# Patient Record
Sex: MALE | Race: BLACK OR AFRICAN AMERICAN | NOT HISPANIC OR LATINO | Employment: OTHER | URBAN - METROPOLITAN AREA
[De-identification: names, ages, dates, MRNs, and addresses within clinical notes are randomized per-mention and may not be internally consistent; named-entity substitution may affect disease eponyms.]

---

## 2017-07-17 ENCOUNTER — HOSPITAL ENCOUNTER (EMERGENCY)
Facility: HOSPITAL | Age: 52
Discharge: HOME | End: 2017-07-17
Attending: EMERGENCY MEDICINE
Payer: OTHER GOVERNMENT

## 2017-07-17 VITALS
RESPIRATION RATE: 20 BRPM | TEMPERATURE: 98 F | BODY MASS INDEX: 32.33 KG/M2 | OXYGEN SATURATION: 97 % | HEIGHT: 75 IN | SYSTOLIC BLOOD PRESSURE: 143 MMHG | DIASTOLIC BLOOD PRESSURE: 71 MMHG | HEART RATE: 75 BPM | WEIGHT: 260 LBS

## 2017-07-17 DIAGNOSIS — M25.511 CHRONIC RIGHT SHOULDER PAIN: Primary | ICD-10-CM

## 2017-07-17 DIAGNOSIS — G89.29 CHRONIC RIGHT SHOULDER PAIN: Primary | ICD-10-CM

## 2017-07-17 PROCEDURE — 25000003 PHARM REV CODE 250: Performed by: EMERGENCY MEDICINE

## 2017-07-17 PROCEDURE — 99283 EMERGENCY DEPT VISIT LOW MDM: CPT

## 2017-07-17 PROCEDURE — 99283 EMERGENCY DEPT VISIT LOW MDM: CPT | Mod: ,,, | Performed by: EMERGENCY MEDICINE

## 2017-07-17 RX ORDER — ZOLPIDEM TARTRATE 10 MG/1
5 TABLET ORAL NIGHTLY PRN
Status: ON HOLD | COMMUNITY
End: 2017-07-28 | Stop reason: HOSPADM

## 2017-07-17 RX ORDER — IBUPROFEN 400 MG/1
800 TABLET ORAL
Status: COMPLETED | OUTPATIENT
Start: 2017-07-17 | End: 2017-07-17

## 2017-07-17 RX ADMIN — IBUPROFEN 800 MG: 400 TABLET, FILM COATED ORAL at 01:07

## 2017-07-17 NOTE — ED PROVIDER NOTES
Encounter Date: 7/17/2017    SCRIBE #1 NOTE: I, Oswaldo Grimaldo, am scribing for, and in the presence of, Dr. Brice.       History     Chief Complaint   Patient presents with    Arm Pain     Pt had right shoulder surgery a few weeks ago. Pt sattes he may have slept on his arm. When pt awoke this evening c/o severe pain to right arm.      Time seen by provider: 1:27 AM    This is a 52 y.o. male with pertinent PMHx of arthritis, tendonitis, and DJD, who presents to the ED with a chief complaint of walking up tonight with moderate to severe right shoulder pain. Pt states that he had surgery on his right shoulder 3-4 weeks ago and had been doing PT and feeling better. Pt states he ran out of the pain medications he had been prescribed but was doing well regardless. However recently the pt did a large amount of strenuous work and then took an Ambien to go to sleep, when he awoke he was lying on his right shoulder and in severe pain. Pt states he is currently on 800mg ibuprofen which has been more or less helpful. There are no other associated symptoms or mitigating/aggravating factors reported at this time.           Review of patient's allergies indicates:  No Known Allergies  Past Medical History:   Diagnosis Date    Arthritis     Degenerative joint disease of spine     Tendonitis      Past Surgical History:   Procedure Laterality Date    ESOPHAGOGASTRODUODENOSCOPY      KNEE SURGERY      SHOULDER SURGERY Right 06/24/2017     No family history on file.  Social History   Substance Use Topics    Smoking status: Never Smoker    Smokeless tobacco: Never Used    Alcohol use No     Review of Systems   Constitutional: Negative for chills and fever.   HENT: Negative for congestion.    Eyes: Negative for pain.   Respiratory: Negative for cough and shortness of breath.    Cardiovascular: Negative for chest pain.   Gastrointestinal: Negative for abdominal pain, nausea and vomiting.   Genitourinary: Negative for difficulty  urinating and dysuria.   Musculoskeletal: Negative for back pain and neck pain.        Positive right shoulder pain.    Skin: Negative for rash.   Neurological: Negative for weakness and headaches.       Physical Exam     Initial Vitals [07/17/17 0010]   BP Pulse Resp Temp SpO2   (!) 143/71 75 20 98.2 °F (36.8 °C) 97 %      MAP       95         Physical Exam    Nursing note and vitals reviewed.  Constitutional: He appears well-developed and well-nourished. No distress.   HENT:   Head: Normocephalic and atraumatic.   Mouth/Throat: Oropharynx is clear and moist.   Eyes: Conjunctivae are normal.   Neck: Normal range of motion.   Cardiovascular: Normal rate, regular rhythm and normal heart sounds.   Pulmonary/Chest: Breath sounds normal. No respiratory distress.   Abdominal: Soft. He exhibits no distension. There is no tenderness.   Musculoskeletal: He exhibits tenderness.   Tenderness to the right anterior deltoid muscle, no bony tenderness. Passive ROM to the right shoulder slightly decreased secondary to pain. Pt is distally neurovascularly intact.    Neurological: He is alert and oriented to person, place, and time.   Skin: Skin is warm and dry.         ED Course   Procedures  Labs Reviewed - No data to display          Medical Decision Making:   History:   Old Medical Records: I decided to obtain old medical records.  Initial Assessment:   This is a 52 y.o. male who presents with right shoulder pain which woke him from sleep that he describes as an exacerbation of his chronic pain. Pain is now improved and I see no indications for imaging. Will give the pt ibuprofen and discharge home to follow up with orthopedics.             Scribe Attestation:   Scribe #1: I performed the above scribed service and the documentation accurately describes the services I performed. I attest to the accuracy of the note.    Attending Attestation:           Physician Attestation for Scribe:  Physician Attestation Statement for Scribe  #1: I, Dr. Brice, reviewed documentation, as scribed by Oswaldo Grimaldo in my presence, and it is both accurate and complete.                 ED Course     Clinical Impression:   The encounter diagnosis was Chronic right shoulder pain.    Disposition:   Disposition: Discharged  Condition: Stable                        Tato Brice MD  07/18/17 2334

## 2017-07-17 NOTE — ED NOTES
Jae Swift, a 52 y.o. male presents to the ED intake 3      Chief Complaint   Patient presents with    Arm Pain     Pt had right shoulder surgery a few weeks ago. Pt sattes he may have slept on his arm. When pt awoke this evening c/o severe pain to right arm.      Review of patient's allergies indicates:  No Known Allergies  Past Medical History:   Diagnosis Date    Arthritis     Degenerative joint disease of spine     Tendonitis

## 2017-07-17 NOTE — ED NOTES
Pt identifiers Jae H Pricechecked and correct  LOC: The patient is awake, alert, aware of environment with an appropriate affect. Oriented x3, speaking appropriately  APPEARANCE: Pt resting comfortably, in no acute distress, pt is clean and well groomed, clothing properly fastened  SKIN: Skin warm, dry and intact, normal skin turgor, moist mucus membranes  RESPIRATORY: Airway is open and patent, respirations are spontaneous, even and unlabored, normal effort and rate  CARDIAC: Normal rate and rhythm, no peripheral edema noted, capillary refill < 3 seconds, bilateral radial pulses 2+  ABDOMEN: Soft, nontender, nondistended. Bowel sounds present   NEUROLOGIC: PERRL, facial expression is symmetrical, patient moving all extremities spontaneously, normal sensation in all extremities when touched with a finger.  Follows all commands appropriately  MUSCULOSKELETAL: No obvious deformities.

## 2017-07-26 ENCOUNTER — HOSPITAL ENCOUNTER (OUTPATIENT)
Facility: HOSPITAL | Age: 52
Discharge: HOME | End: 2017-07-28
Attending: EMERGENCY MEDICINE | Admitting: EMERGENCY MEDICINE
Payer: OTHER GOVERNMENT

## 2017-07-26 DIAGNOSIS — L03.113 CELLULITIS OF RIGHT UPPER EXTREMITY: Primary | ICD-10-CM

## 2017-07-26 DIAGNOSIS — A41.9 SEPSIS, DUE TO UNSPECIFIED ORGANISM: ICD-10-CM

## 2017-07-26 LAB
ALBUMIN SERPL BCP-MCNC: 3.3 G/DL
ALP SERPL-CCNC: 60 U/L
ALT SERPL W/O P-5'-P-CCNC: 19 U/L
ANION GAP SERPL CALC-SCNC: 7 MMOL/L
AST SERPL-CCNC: 18 U/L
BASOPHILS # BLD AUTO: 0.02 K/UL
BASOPHILS NFR BLD: 0.1 %
BILIRUB SERPL-MCNC: 0.9 MG/DL
BUN SERPL-MCNC: 15 MG/DL
CALCIUM SERPL-MCNC: 8.9 MG/DL
CHLORIDE SERPL-SCNC: 105 MMOL/L
CO2 SERPL-SCNC: 25 MMOL/L
CREAT SERPL-MCNC: 0.9 MG/DL
DIFFERENTIAL METHOD: ABNORMAL
EOSINOPHIL # BLD AUTO: 0.1 K/UL
EOSINOPHIL NFR BLD: 0.6 %
ERYTHROCYTE [DISTWIDTH] IN BLOOD BY AUTOMATED COUNT: 14.2 %
EST. GFR  (AFRICAN AMERICAN): >60 ML/MIN/1.73 M^2
EST. GFR  (NON AFRICAN AMERICAN): >60 ML/MIN/1.73 M^2
GLUCOSE SERPL-MCNC: 132 MG/DL
HCT VFR BLD AUTO: 39.7 %
HGB BLD-MCNC: 13.8 G/DL
LYMPHOCYTES # BLD AUTO: 1 K/UL
LYMPHOCYTES NFR BLD: 6.3 %
MCH RBC QN AUTO: 30.9 PG
MCHC RBC AUTO-ENTMCNC: 34.8 G/DL
MCV RBC AUTO: 89 FL
MONOCYTES # BLD AUTO: 1 K/UL
MONOCYTES NFR BLD: 6.3 %
NEUTROPHILS # BLD AUTO: 13.6 K/UL
NEUTROPHILS NFR BLD: 86.3 %
PLATELET # BLD AUTO: 166 K/UL
PMV BLD AUTO: 10.5 FL
POTASSIUM SERPL-SCNC: 3.4 MMOL/L
PROT SERPL-MCNC: 6.9 G/DL
RBC # BLD AUTO: 4.46 M/UL
SODIUM SERPL-SCNC: 137 MMOL/L
WBC # BLD AUTO: 15.72 K/UL

## 2017-07-26 PROCEDURE — G0378 HOSPITAL OBSERVATION PER HR: HCPCS

## 2017-07-26 PROCEDURE — 63600175 PHARM REV CODE 636 W HCPCS: Performed by: HOSPITALIST

## 2017-07-26 PROCEDURE — 80053 COMPREHEN METABOLIC PANEL: CPT

## 2017-07-26 PROCEDURE — 96365 THER/PROPH/DIAG IV INF INIT: CPT

## 2017-07-26 PROCEDURE — 63600175 PHARM REV CODE 636 W HCPCS

## 2017-07-26 PROCEDURE — 83036 HEMOGLOBIN GLYCOSYLATED A1C: CPT

## 2017-07-26 PROCEDURE — 99284 EMERGENCY DEPT VISIT MOD MDM: CPT | Mod: 25

## 2017-07-26 PROCEDURE — 25000003 PHARM REV CODE 250

## 2017-07-26 PROCEDURE — 25000003 PHARM REV CODE 250: Performed by: HOSPITALIST

## 2017-07-26 PROCEDURE — 99219 PR INITIAL OBSERVATION CARE,LEVL II: CPT | Mod: ,,, | Performed by: HOSPITALIST

## 2017-07-26 PROCEDURE — 85025 COMPLETE CBC W/AUTO DIFF WBC: CPT

## 2017-07-26 PROCEDURE — 25000003 PHARM REV CODE 250: Performed by: EMERGENCY MEDICINE

## 2017-07-26 PROCEDURE — 99284 EMERGENCY DEPT VISIT MOD MDM: CPT | Performed by: EMERGENCY MEDICINE

## 2017-07-26 PROCEDURE — A4216 STERILE WATER/SALINE, 10 ML: HCPCS | Performed by: HOSPITALIST

## 2017-07-26 PROCEDURE — 82962 GLUCOSE BLOOD TEST: CPT

## 2017-07-26 RX ORDER — HYDROCODONE BITARTRATE AND ACETAMINOPHEN 5; 325 MG/1; MG/1
1 TABLET ORAL EVERY 4 HOURS PRN
Status: DISCONTINUED | OUTPATIENT
Start: 2017-07-26 | End: 2017-07-28 | Stop reason: HOSPADM

## 2017-07-26 RX ORDER — ACETAMINOPHEN 325 MG/1
650 TABLET ORAL EVERY 4 HOURS PRN
Status: DISCONTINUED | OUTPATIENT
Start: 2017-07-26 | End: 2017-07-28 | Stop reason: HOSPADM

## 2017-07-26 RX ORDER — MORPHINE SULFATE 2 MG/ML
4 INJECTION, SOLUTION INTRAMUSCULAR; INTRAVENOUS EVERY 4 HOURS PRN
Status: DISCONTINUED | OUTPATIENT
Start: 2017-07-26 | End: 2017-07-27

## 2017-07-26 RX ORDER — AMOXICILLIN 250 MG
1 CAPSULE ORAL 2 TIMES DAILY
Status: DISCONTINUED | OUTPATIENT
Start: 2017-07-26 | End: 2017-07-28 | Stop reason: HOSPADM

## 2017-07-26 RX ORDER — HYDROCODONE BITARTRATE AND ACETAMINOPHEN 5; 325 MG/1; MG/1
1 TABLET ORAL EVERY 4 HOURS PRN
Status: DISCONTINUED | OUTPATIENT
Start: 2017-07-26 | End: 2017-07-26

## 2017-07-26 RX ORDER — SODIUM CHLORIDE 0.9 % (FLUSH) 0.9 %
3 SYRINGE (ML) INJECTION EVERY 8 HOURS
Status: DISCONTINUED | OUTPATIENT
Start: 2017-07-26 | End: 2017-07-28 | Stop reason: HOSPADM

## 2017-07-26 RX ORDER — CEFAZOLIN SODIUM 1 G/50ML
1 SOLUTION INTRAVENOUS
Status: DISCONTINUED | OUTPATIENT
Start: 2017-07-26 | End: 2017-07-26

## 2017-07-26 RX ORDER — ENOXAPARIN SODIUM 100 MG/ML
40 INJECTION SUBCUTANEOUS EVERY 24 HOURS
Status: DISCONTINUED | OUTPATIENT
Start: 2017-07-26 | End: 2017-07-28 | Stop reason: HOSPADM

## 2017-07-26 RX ORDER — CEFAZOLIN SODIUM 1 G/3ML
1 INJECTION, POWDER, FOR SOLUTION INTRAMUSCULAR; INTRAVENOUS
Status: DISCONTINUED | OUTPATIENT
Start: 2017-07-26 | End: 2017-07-26

## 2017-07-26 RX ORDER — CEFAZOLIN SODIUM 1 G/50ML
1 SOLUTION INTRAVENOUS ONCE
Status: COMPLETED | OUTPATIENT
Start: 2017-07-26 | End: 2017-07-26

## 2017-07-26 RX ORDER — ONDANSETRON 2 MG/ML
4 INJECTION INTRAMUSCULAR; INTRAVENOUS EVERY 12 HOURS PRN
Status: DISCONTINUED | OUTPATIENT
Start: 2017-07-26 | End: 2017-07-28 | Stop reason: HOSPADM

## 2017-07-26 RX ADMIN — HYDROCODONE BITARTRATE AND ACETAMINOPHEN 1 TABLET: 5; 325 TABLET ORAL at 04:07

## 2017-07-26 RX ADMIN — MORPHINE SULFATE 4 MG: 2 INJECTION, SOLUTION INTRAMUSCULAR; INTRAVENOUS at 11:07

## 2017-07-26 RX ADMIN — ENOXAPARIN SODIUM 40 MG: 100 INJECTION SUBCUTANEOUS at 04:07

## 2017-07-26 RX ADMIN — SODIUM CHLORIDE 1000 ML: 0.9 INJECTION, SOLUTION INTRAVENOUS at 09:07

## 2017-07-26 RX ADMIN — VANCOMYCIN HYDROCHLORIDE 1750 MG: 100 INJECTION, POWDER, LYOPHILIZED, FOR SOLUTION INTRAVENOUS at 05:07

## 2017-07-26 RX ADMIN — Medication 3 ML: at 11:07

## 2017-07-26 RX ADMIN — MORPHINE SULFATE 4 MG: 2 INJECTION, SOLUTION INTRAMUSCULAR; INTRAVENOUS at 01:07

## 2017-07-26 RX ADMIN — HYDROCODONE BITARTRATE AND ACETAMINOPHEN 1 TABLET: 5; 325 TABLET ORAL at 08:07

## 2017-07-26 RX ADMIN — STANDARDIZED SENNA CONCENTRATE AND DOCUSATE SODIUM 1 TABLET: 8.6; 5 TABLET, FILM COATED ORAL at 08:07

## 2017-07-26 RX ADMIN — CEFAZOLIN SODIUM 1 G: 1 SOLUTION INTRAVENOUS at 09:07

## 2017-07-26 RX ADMIN — ACETAMINOPHEN 650 MG: 325 TABLET ORAL at 08:07

## 2017-07-26 RX ADMIN — MORPHINE SULFATE 4 MG: 2 INJECTION, SOLUTION INTRAMUSCULAR; INTRAVENOUS at 06:07

## 2017-07-26 RX ADMIN — HYDROCODONE BITARTRATE AND ACETAMINOPHEN 1 TABLET: 5; 325 TABLET ORAL at 10:07

## 2017-07-26 NOTE — SUBJECTIVE & OBJECTIVE
Past Medical History:   Diagnosis Date    Arthritis     Degenerative joint disease of spine     Tendonitis        Past Surgical History:   Procedure Laterality Date    ESOPHAGOGASTRODUODENOSCOPY      KNEE SURGERY      SHOULDER SURGERY Right 06/24/2017       Review of patient's allergies indicates:  No Known Allergies    No current facility-administered medications on file prior to encounter.      Current Outpatient Prescriptions on File Prior to Encounter   Medication Sig    oxycodone-acetaminophen (PERCOCET) 5-325 mg per tablet Take 1 tablet by mouth every 4 (four) hours as needed for Pain.    zolpidem (AMBIEN) 10 mg Tab Take 5 mg by mouth nightly as needed.     Family History     Problem Relation (Age of Onset)    Diabetes Mother, Father        Social History Main Topics    Smoking status: Never Smoker    Smokeless tobacco: Never Used    Alcohol use No    Drug use: No    Sexual activity: Not on file     Review of Systems   Constitutional: Negative for appetite change, chills, diaphoresis, fatigue, fever and unexpected weight change.             HENT: Negative for congestion and trouble swallowing.    Eyes: Negative for visual disturbance.   Respiratory: Negative for cough, shortness of breath and wheezing.    Cardiovascular: Negative for chest pain and leg swelling.   Gastrointestinal: Negative for abdominal pain, constipation, diarrhea, nausea and vomiting.   Genitourinary: Negative for difficulty urinating and dysuria.   Musculoskeletal: Negative for arthralgias, back pain, gait problem, neck pain and neck stiffness.   Skin: Positive for rash. Negative for wound.               Neurological: Negative for dizziness, speech difficulty, light-headedness and headaches.   Psychiatric/Behavioral: Negative for agitation and behavioral problems.     Objective:     Vital Signs (Most Recent):  Temp: 98.3 °F (36.8 °C) (07/26/17 1200)  Pulse: 76 (07/26/17 1200)  Resp: 16 (07/26/17 1200)  BP: 114/73 (07/26/17  1200)  SpO2: 97 % (07/26/17 1200) Vital Signs (24h Range):  Temp:  [98.3 °F (36.8 °C)-98.6 °F (37 °C)] 98.3 °F (36.8 °C)  Pulse:  [76-93] 76  Resp:  [16] 16  SpO2:  [97 %-99 %] 97 %  BP: (114-137)/(73-85) 114/73     Weight: 120.2 kg (265 lb)  Body mass index is 33.12 kg/m².    Physical Exam   Constitutional: He is oriented to person, place, and time. He appears well-developed and well-nourished. No distress.   HENT:   Head: Normocephalic.   Mouth/Throat: Oropharynx is clear and moist.   Eyes: Conjunctivae and EOM are normal. Pupils are equal, round, and reactive to light. No scleral icterus.   Neck: Normal range of motion and full passive range of motion without pain. Neck supple. No JVD present. Carotid bruit is not present. No tracheal deviation, no edema and normal range of motion present. No thyromegaly present.   Cardiovascular: Normal rate, regular rhythm, normal heart sounds and intact distal pulses.  Exam reveals no gallop and no friction rub.    No murmur heard.  Pulmonary/Chest: Effort normal and breath sounds normal. No accessory muscle usage or stridor. No apnea. No respiratory distress. He has no wheezes. He has no rales. He exhibits no tenderness.   Abdominal: Soft. Bowel sounds are normal. He exhibits no distension, no ascites and no mass. There is no tenderness. There is no rebound and no guarding.   Musculoskeletal: Normal range of motion. He exhibits no edema or tenderness.   Lymphadenopathy:        Head (right side): No posterior auricular adenopathy present.     He has no cervical adenopathy.   Neurological: He is alert and oriented to person, place, and time. He has normal strength. He displays normal reflexes.   Skin: Skin is warm and dry. No abrasion, no bruising, no ecchymosis, no laceration and no rash noted. He is not diaphoretic. No cyanosis or erythema. No pallor. Nails show no clubbing.   superfiscial spreading eyrthema rght arn, marked.  Tender and warm to tough.      Full ROM at elbow  ans shoulder joint with out tenderness   Psychiatric: He has a normal mood and affect. His behavior is normal. Judgment and thought content normal.        Significant Labs:   CBC:   Recent Labs  Lab 07/26/17 0914   WBC 15.72*   HGB 13.8*   HCT 39.7*        CMP:   Recent Labs  Lab 07/26/17 0914      K 3.4*      CO2 25   *   BUN 15   CREATININE 0.9   CALCIUM 8.9   PROT 6.9   ALBUMIN 3.3*   BILITOT 0.9   ALKPHOS 60   AST 18   ALT 19   ANIONGAP 7*   EGFRNONAA >60.0       Significant Imaging: I have reviewed and interpreted all pertinent imaging results/findings within the past 24 hours.

## 2017-07-26 NOTE — H&P
[facility] Hospital Medicine  History & Physical    Patient Name: Jae Swift  MRN: [id number]  Admission Date: 7/26/2017  Attending Physician: Shiloh Ricks MD  Primary Care Provider: Primary Doctor [facility] Medicine Team: [facility] Shiloh Ricks MD     Patient information was obtained from patient and ER records.     Subjective:     Principal Problem:<principal problem not specified>    Chief Complaint:   Chief Complaint   Patient presents with    Abscess        HPI: 52 y.o. male who Noticed pain and erythema of the right arm after cutting grass yesterday,  He  failed tyenol, moving made pain worse,  He has decreased ROM arm.  erythtem is located over dorsollateral aspect of right arm.  Warm to touch and marked.  He has history of shoulder surgery this year.  He denied fever,chills  insect bites, chest pain, SO, N&V    Given ancef 1 nena IV in ED.     Past Medical History:   Diagnosis Date    Arthritis     Degenerative joint disease of spine     Tendonitis        Past Surgical History:   Procedure Laterality Date    ESOPHAGOGASTRODUODENOSCOPY      KNEE SURGERY      SHOULDER SURGERY Right 06/24/2017       Review of patient's allergies indicates:  No Known Allergies    No current facility-administered medications on file prior to encounter.      Current Outpatient Prescriptions on File Prior to Encounter   Medication Sig    oxycodone-acetaminophen (PERCOCET) 5-325 mg per tablet Take 1 tablet by mouth every 4 (four) hours as needed for Pain.    zolpidem (AMBIEN) 10 mg Tab Take 5 mg by mouth nightly as needed.     Family History     Problem Relation (Age of Onset)    Diabetes Mother, Father        Social History Main Topics    Smoking status: Never Smoker    Smokeless tobacco: Never Used    Alcohol use No    Drug use: No    Sexual activity: Not on file     Review of Systems   Constitutional: Negative for appetite change, chills, diaphoresis, fatigue, fever and  unexpected weight change.             HENT: Negative for congestion and trouble swallowing.    Eyes: Negative for visual disturbance.   Respiratory: Negative for cough, shortness of breath and wheezing.    Cardiovascular: Negative for chest pain and leg swelling.   Gastrointestinal: Negative for abdominal pain, constipation, diarrhea, nausea and vomiting.   Genitourinary: Negative for difficulty urinating and dysuria.   Musculoskeletal: Negative for arthralgias, back pain, gait problem, neck pain and neck stiffness.   Skin: Positive for rash. Negative for wound.               Neurological: Negative for dizziness, speech difficulty, light-headedness and headaches.   Psychiatric/Behavioral: Negative for agitation and behavioral problems.     Objective:     Vital Signs (Most Recent):  Temp: 98.3 °F (36.8 °C) (07/26/17 1200)  Pulse: 76 (07/26/17 1200)  Resp: 16 (07/26/17 1200)  BP: 114/73 (07/26/17 1200)  SpO2: 97 % (07/26/17 1200) Vital Signs (24h Range):  Temp:  [98.3 °F (36.8 °C)-98.6 °F (37 °C)] 98.3 °F (36.8 °C)  Pulse:  [76-93] 76  Resp:  [16] 16  SpO2:  [97 %-99 %] 97 %  BP: (114-137)/(73-85) 114/73     Weight: 120.2 kg (265 lb)  Body mass index is 33.12 kg/m².    Physical Exam   Constitutional: He is oriented to person, place, and time. He appears well-developed and well-nourished. No distress.   HENT:   Head: Normocephalic.   Mouth/Throat: Oropharynx is clear and moist.   Eyes: Conjunctivae and EOM are normal. Pupils are equal, round, and reactive to light. No scleral icterus.   Neck: Normal range of motion and full passive range of motion without pain. Neck supple. No JVD present. Carotid bruit is not present. No tracheal deviation, no edema and normal range of motion present. No thyromegaly present.   Cardiovascular: Normal rate, regular rhythm, normal heart sounds and intact distal pulses.  Exam reveals no gallop and no friction rub.    No murmur heard.  Pulmonary/Chest: Effort normal and breath sounds  normal. No accessory muscle usage or stridor. No apnea. No respiratory distress. He has no wheezes. He has no rales. He exhibits no tenderness.   Abdominal: Soft. Bowel sounds are normal. He exhibits no distension, no ascites and no mass. There is no tenderness. There is no rebound and no guarding.   Musculoskeletal: Normal range of motion. He exhibits no edema or tenderness.   Lymphadenopathy:        Head (right side): No posterior auricular adenopathy present.     He has no cervical adenopathy.   Neurological: He is alert and oriented to person, place, and time. He has normal strength. He displays normal reflexes.   Skin: Skin is warm and dry. No abrasion, no bruising, no ecchymosis, no laceration and no rash noted. He is not diaphoretic. No cyanosis or erythema. No pallor. Nails show no clubbing.   superfiscial spreading eyrthema rght arn, marked.  Tender and warm to tough.      Full ROM at elbow ans shoulder joint with out tenderness   Psychiatric: He has a normal mood and affect. His behavior is normal. Judgment and thought content normal.        Significant Labs:   CBC:   Recent Labs  Lab 07/26/17  0914   WBC 15.72*   HGB 13.8*   HCT 39.7*        CMP:   Recent Labs  Lab 07/26/17  0914      K 3.4*      CO2 25   *   BUN 15   CREATININE 0.9   CALCIUM 8.9   PROT 6.9   ALBUMIN 3.3*   BILITOT 0.9   ALKPHOS 60   AST 18   ALT 19   ANIONGAP 7*   EGFRNONAA >60.0       Significant Imaging: I have reviewed and interpreted all pertinent imaging results/findings within the past 24 hours.    Assessment/Plan:     Sepsis    2/4 SIRs + source, cellulits  IV ancef given in ED and no response yet.  Will change to IV vanc.          Cellulitis of right upper extremity    As above          VTE Risk Mitigation         Ordered     enoxaparin injection 40 mg  Daily     Route:  Subcutaneous        07/26/17 1152     Medium Risk of VTE  Once      07/26/17 1520        Shiloh Ricks MD  Department of Hospital  Medicine   [facility]

## 2017-07-26 NOTE — ED PROVIDER NOTES
Encounter Date: 7/26/2017       History     Chief Complaint   Patient presents with    Abscess     51yo male with medical history of arthritis presents to ED with swelling, warmth and tenderness of right upper extremity. Patient states symptoms occurred yesterday after cutting grass. His right arm was red and tender, took a nap and when he woke up, the redness, warmth and swelling had worsened. He has concerns for possibility of an insect bite. Symptoms have continued to worsen in the past 12 hours. Reports recent shoulder surgery approximately  1 month ago. Reports stable pain and decreased ROM to right shoulder. Patient denies fever, chills, n/v, cp,sob, drainage, weakness, syncope, changes in urination, changes in bowel.           Review of patient's allergies indicates:  No Known Allergies  Past Medical History:   Diagnosis Date    Arthritis     Degenerative joint disease of spine     Tendonitis      Past Surgical History:   Procedure Laterality Date    ESOPHAGOGASTRODUODENOSCOPY      KNEE SURGERY      SHOULDER SURGERY Right 06/24/2017     Family History   Problem Relation Age of Onset    Diabetes Mother     Diabetes Father      Social History   Substance Use Topics    Smoking status: Never Smoker    Smokeless tobacco: Never Used    Alcohol use No     Review of Systems   Constitutional: Negative for diaphoresis and fever.   HENT: Negative for nosebleeds.    Eyes: Negative for visual disturbance.   Respiratory: Negative for cough and shortness of breath.    Cardiovascular: Negative for chest pain and leg swelling.   Gastrointestinal: Negative for abdominal distention, nausea and vomiting.   Genitourinary: Negative for dysuria and hematuria.   Musculoskeletal: Negative for back pain and neck pain.   Skin: Positive for color change. Negative for pallor and rash.   Neurological: Negative for syncope, weakness, light-headedness and headaches.   Psychiatric/Behavioral: The patient is not nervous/anxious.         Physical Exam     Initial Vitals [07/26/17 0831]   BP Pulse Resp Temp SpO2   124/73 93 16 98.6 °F (37 °C) 97 %      MAP       90         Physical Exam    Vitals reviewed.  Constitutional: Vital signs are normal. He appears well-developed and well-nourished. He is not diaphoretic. No distress.   HENT:   Head: Normocephalic and atraumatic.   Nose: Nose normal.   Mouth/Throat: Oropharynx is clear and moist.   Eyes: Conjunctivae, EOM and lids are normal. Pupils are equal, round, and reactive to light. Lids are everted and swept, no foreign bodies found. Right eye exhibits no discharge. Left eye exhibits no discharge.   Neck: Trachea normal and normal range of motion. Neck supple.   Cardiovascular: Normal rate, regular rhythm, intact distal pulses and normal pulses.   No murmur heard.  Pulmonary/Chest: Breath sounds normal. He has no wheezes. He has no rhonchi. He has no rales.   Abdominal: Soft. Normal appearance and bowel sounds are normal. He exhibits no distension. There is no tenderness.   Musculoskeletal: Normal range of motion.   Neurological: He is alert and oriented to person, place, and time. He has normal strength. No sensory deficit.   Skin: Skin is warm and dry. Capillary refill takes less than 2 seconds. No rash and no abscess noted. There is erythema. No cyanosis.   Psychiatric: He has a normal mood and affect.         ED Course   Procedures  Labs Reviewed   CBC W/ AUTO DIFFERENTIAL - Abnormal; Notable for the following:        Result Value    WBC 15.72 (*)     RBC 4.46 (*)     Hemoglobin 13.8 (*)     Hematocrit 39.7 (*)     Gran # 13.6 (*)     Gran% 86.3 (*)     Lymph% 6.3 (*)     All other components within normal limits   COMPREHENSIVE METABOLIC PANEL - Abnormal; Notable for the following:     Potassium 3.4 (*)     Glucose 132 (*)     Albumin 3.3 (*)     Anion Gap 7 (*)     All other components within normal limits                   APC / Resident Notes:   53yo male with medical history of  arthritis presents to ED with swelling, warmth and tenderness of right upper extremity. Cardiac exam reveals regular rate and rhythm. Lungs clear bilaterally on auscultation with no decreased breath sounds. Abdomen is soft, non tender, non distended with normal bowel sounds x 4. Erythema, warmth and swelling noted from mid right upper arm extending to right wrist. Tenderness inferior of elbow. ROM intact. Strength intact. Sensation intact. No erythema, warmth or swelling to right shoulder, decreased ROM secondary to surgery. Vitals stable. Patient is in no acute distress.     Patient given IVF and IVPB ancef 1g.     Labs reviewed.   WBC 15.72. CMP wnl.    DDX includes but is not limited to cellulitis, abscess, electrolyte abnormality.     Patient placed in observation.    I have discussed and reviewed with my supervising physician.              ED Course     Clinical Impression:   The encounter diagnosis was Cellulitis of right upper extremity.    Disposition:   Disposition: Placed in Observation  Condition: Stable                        Agnes Joyner PA-C  07/26/17 8790

## 2017-07-26 NOTE — ASSESSMENT & PLAN NOTE
2/4 SIRs + source, cellulits  IV ancef given in ED and no response yet.  Will change to IV vanc.

## 2017-07-26 NOTE — PLAN OF CARE
Problem: Patient Care Overview  Goal: Plan of Care Review  Outcome: Ongoing (interventions implemented as appropriate)  Report received from TATYANA Pablo in ED. Pt arrived to unit via WC. Ambulated from WC to bed. AAOx4. VSS on RA. C/o pain 6/10. Received Anita in ED right before being transferred to unit. Will reassess pain level. Edema, redness and warmth noted to R arm. Bed in lowest locked position and call light with reach. NAD noted. Will continue to monitor.

## 2017-07-26 NOTE — HPI
52 y.o. male who Noticed pain and erythema of the right arm after cutting grass yesterday,  He  failed tyenol, moving made pain worse,  He has decreased ROM arm.  erythtem is located over dorsollateral aspect of right arm.  Warm to touch and marked.  He has history of shoulder surgery this year.  He denied fever,chills  insect bites, chest pain, SO, N&V    Given ancef 1 nena IV in ED.

## 2017-07-26 NOTE — HOSPITAL COURSE
"7/27 - rash is improved.  Pain is out of proportion to what I would epect with this rash.  Suspect hyperalgesia from previous use of opioids.  Will keep in hospital for observation to make sure he is responding apprpriately.     7/28 - rash clinically improved.  Pain  And ROM improved.  Discused hyperalgesia and gave him "Safe use and SE of Opioids."  Pt in transition b/t acute to chronic pain, needs to stop opioids and use alternatives to prevent opioid dependence.   Neurontin started for 7 days.   F/u pcp  "

## 2017-07-26 NOTE — PLAN OF CARE
Problem: Patient Care Overview  Goal: Plan of Care Review  Outcome: Ongoing (interventions implemented as appropriate)  Safety maintained. VSS on RA. Except for temp, has low grade temp. Pain controlled with prn [place] PO and Morphine IVP. Edema, redness and warmth noted to R arm. Does not appear to have spread. Vancomycin IVPB ordered. Will administer as scheduled. Bed in lowest locked position and call light with reach. NAD noted. Will continue to monitor.

## 2017-07-27 PROBLEM — T40.2X5A OPIOID-INDUCED HYPERALGESIA: Status: ACTIVE | Noted: 2017-07-27

## 2017-07-27 PROBLEM — R20.8 OPIOID-INDUCED HYPERALGESIA: Status: ACTIVE | Noted: 2017-07-27

## 2017-07-27 LAB
ALBUMIN SERPL BCP-MCNC: 3 G/DL
ALP SERPL-CCNC: 52 U/L
ALT SERPL W/O P-5'-P-CCNC: 13 U/L
ANION GAP SERPL CALC-SCNC: 6 MMOL/L
AST SERPL-CCNC: 11 U/L
BASOPHILS # BLD AUTO: 0.02 K/UL
BASOPHILS NFR BLD: 0.1 %
BILIRUB SERPL-MCNC: 0.8 MG/DL
BUN SERPL-MCNC: 10 MG/DL
CALCIUM SERPL-MCNC: 8.9 MG/DL
CHLORIDE SERPL-SCNC: 107 MMOL/L
CO2 SERPL-SCNC: 24 MMOL/L
CREAT SERPL-MCNC: 0.9 MG/DL
DIFFERENTIAL METHOD: ABNORMAL
EOSINOPHIL # BLD AUTO: 0.1 K/UL
EOSINOPHIL NFR BLD: 0.7 %
ERYTHROCYTE [DISTWIDTH] IN BLOOD BY AUTOMATED COUNT: 14.5 %
EST. GFR  (AFRICAN AMERICAN): >60 ML/MIN/1.73 M^2
EST. GFR  (NON AFRICAN AMERICAN): >60 ML/MIN/1.73 M^2
ESTIMATED AVG GLUCOSE: 117 MG/DL
GLUCOSE SERPL-MCNC: 105 MG/DL
HBA1C MFR BLD HPLC: 5.7 %
HCT VFR BLD AUTO: 38.4 %
HGB BLD-MCNC: 13.1 G/DL
LYMPHOCYTES # BLD AUTO: 1.7 K/UL
LYMPHOCYTES NFR BLD: 10.4 %
MAGNESIUM SERPL-MCNC: 1.8 MG/DL
MCH RBC QN AUTO: 30 PG
MCHC RBC AUTO-ENTMCNC: 34.1 G/DL
MCV RBC AUTO: 88 FL
MONOCYTES # BLD AUTO: 0.9 K/UL
MONOCYTES NFR BLD: 5.6 %
NEUTROPHILS # BLD AUTO: 13.5 K/UL
NEUTROPHILS NFR BLD: 83 %
PHOSPHATE SERPL-MCNC: 2.4 MG/DL
PLATELET # BLD AUTO: 182 K/UL
PMV BLD AUTO: 11.3 FL
POTASSIUM SERPL-SCNC: 3.6 MMOL/L
PROT SERPL-MCNC: 6.6 G/DL
RBC # BLD AUTO: 4.36 M/UL
SODIUM SERPL-SCNC: 137 MMOL/L
TSH SERPL DL<=0.005 MIU/L-ACNC: 0.56 UIU/ML
WBC # BLD AUTO: 16.31 K/UL

## 2017-07-27 PROCEDURE — 83735 ASSAY OF MAGNESIUM: CPT

## 2017-07-27 PROCEDURE — 85025 COMPLETE CBC W/AUTO DIFF WBC: CPT

## 2017-07-27 PROCEDURE — A4216 STERILE WATER/SALINE, 10 ML: HCPCS | Performed by: HOSPITALIST

## 2017-07-27 PROCEDURE — 84443 ASSAY THYROID STIM HORMONE: CPT

## 2017-07-27 PROCEDURE — 99226 PR SUBSEQUENT OBSERVATION CARE,LEVEL III: CPT | Mod: ,,, | Performed by: HOSPITALIST

## 2017-07-27 PROCEDURE — 63600175 PHARM REV CODE 636 W HCPCS

## 2017-07-27 PROCEDURE — 84100 ASSAY OF PHOSPHORUS: CPT

## 2017-07-27 PROCEDURE — 63600175 PHARM REV CODE 636 W HCPCS: Performed by: HOSPITALIST

## 2017-07-27 PROCEDURE — G0378 HOSPITAL OBSERVATION PER HR: HCPCS

## 2017-07-27 PROCEDURE — 25000003 PHARM REV CODE 250: Performed by: HOSPITALIST

## 2017-07-27 PROCEDURE — 80053 COMPREHEN METABOLIC PANEL: CPT

## 2017-07-27 PROCEDURE — 25000003 PHARM REV CODE 250

## 2017-07-27 PROCEDURE — 36415 COLL VENOUS BLD VENIPUNCTURE: CPT

## 2017-07-27 RX ORDER — IBUPROFEN 400 MG/1
400 TABLET ORAL EVERY 6 HOURS PRN
Status: DISCONTINUED | OUTPATIENT
Start: 2017-07-27 | End: 2017-07-28 | Stop reason: HOSPADM

## 2017-07-27 RX ORDER — GABAPENTIN 100 MG/1
200 CAPSULE ORAL 3 TIMES DAILY
Status: DISCONTINUED | OUTPATIENT
Start: 2017-07-27 | End: 2017-07-28 | Stop reason: HOSPADM

## 2017-07-27 RX ORDER — MORPHINE SULFATE ORAL SOLUTION 10 MG/5ML
10 SOLUTION ORAL EVERY 6 HOURS PRN
Status: DISCONTINUED | OUTPATIENT
Start: 2017-07-27 | End: 2017-07-28 | Stop reason: HOSPADM

## 2017-07-27 RX ADMIN — HYDROCODONE BITARTRATE AND ACETAMINOPHEN 1 TABLET: 5; 325 TABLET ORAL at 01:07

## 2017-07-27 RX ADMIN — ENOXAPARIN SODIUM 40 MG: 100 INJECTION SUBCUTANEOUS at 05:07

## 2017-07-27 RX ADMIN — Medication 10 MG: at 06:07

## 2017-07-27 RX ADMIN — Medication 3 ML: at 02:07

## 2017-07-27 RX ADMIN — STANDARDIZED SENNA CONCENTRATE AND DOCUSATE SODIUM 1 TABLET: 8.6; 5 TABLET, FILM COATED ORAL at 08:07

## 2017-07-27 RX ADMIN — ACETAMINOPHEN 650 MG: 325 TABLET ORAL at 08:07

## 2017-07-27 RX ADMIN — GABAPENTIN 200 MG: 100 CAPSULE ORAL at 08:07

## 2017-07-27 RX ADMIN — IBUPROFEN 400 MG: 400 TABLET, FILM COATED ORAL at 03:07

## 2017-07-27 RX ADMIN — Medication 3 ML: at 08:07

## 2017-07-27 RX ADMIN — VANCOMYCIN HYDROCHLORIDE 1750 MG: 100 INJECTION, POWDER, LYOPHILIZED, FOR SOLUTION INTRAVENOUS at 06:07

## 2017-07-27 RX ADMIN — Medication 3 ML: at 06:07

## 2017-07-27 RX ADMIN — VANCOMYCIN HYDROCHLORIDE 1750 MG: 100 INJECTION, POWDER, LYOPHILIZED, FOR SOLUTION INTRAVENOUS at 05:07

## 2017-07-27 RX ADMIN — ACETAMINOPHEN 650 MG: 325 TABLET ORAL at 03:07

## 2017-07-27 RX ADMIN — IBUPROFEN 400 MG: 400 TABLET, FILM COATED ORAL at 08:07

## 2017-07-27 RX ADMIN — MORPHINE SULFATE 4 MG: 2 INJECTION, SOLUTION INTRAMUSCULAR; INTRAVENOUS at 06:07

## 2017-07-27 NOTE — PLAN OF CARE
PCP- NONE    Pt has a ride home and family support with mother        07/27/17 1701   Discharge Assessment   Assessment Type Discharge Planning Assessment   Confirmed/corrected address and phone number on facesheet? Yes   Assessment information obtained from? Patient   Expected Length of Stay (days) 3   Communicated expected length of stay with patient/caregiver yes   Prior to hospitilization cognitive status: Alert/Oriented   Prior to hospitalization functional status: Independent   Current cognitive status: Alert/Oriented   Current Functional Status: Independent   Arrived From home or self-care   Lives With alone   Able to Return to Prior Arrangements yes   Is patient able to care for self after discharge? Yes   How many people do you have in your home that can help with your care after discharge? 0   Patient's perception of discharge disposition home health   Readmission Within The Last 30 Days no previous admission in last 30 days   Patient currently being followed by outpatient case management? No   Does the patient currently use HME? No   Patient currently receives private duty nursing? No   Patient currently receives any other outside agency services? No   Equipment Currently Used at Home none   Do you have any problems affording any of your prescribed medications? No   Is the patient taking medications as prescribed? yes   Do you have any financial concerns preventing you from receiving the healthcare you need? No   Does the patient have transportation to healthcare appointments? Yes   Transportation Available family or friend will provide   On Dialysis? No   Does the patient receive services at the [facility]mad[facility] Clinic? No   Are there any open cases? No   Discharge Plan A Home with family   Discharge Plan B Home with family;Home Health   Patient/Family In Agreement With Plan yes

## 2017-07-27 NOTE — ASSESSMENT & PLAN NOTE
7/27 - wbc still high,T max 100,  still w significant pain, admit to hospital  2/4 SIRs + source, cellulits  IV ancef given in ED and no response yet.  Will change to IV vanc.

## 2017-07-27 NOTE — SUBJECTIVE & OBJECTIVE
Review of Systems   Constitutional: Negative for chills, diaphoresis and fever.             Respiratory: Negative for cough, shortness of breath and wheezing.    Cardiovascular: Negative for chest pain and leg swelling.   Gastrointestinal: Negative for abdominal pain, constipation, diarrhea, nausea and vomiting.   Genitourinary: Negative for difficulty urinating and dysuria.   Musculoskeletal: Negative for arthralgias, back pain, gait problem, neck pain and neck stiffness.   Skin: Positive for rash. Negative for wound.               Neurological: Negative for dizziness and headaches.   Psychiatric/Behavioral: Negative for agitation and behavioral problems.     Objective:     Vital Signs (Most Recent):  Temp: 99.1 °F (37.3 °C) (07/27/17 1200)  Pulse: 85 (07/27/17 1200)  Resp: 16 (07/27/17 1200)  BP: 128/81 (07/27/17 1200)  SpO2: 98 % (07/27/17 1200) Vital Signs (24h Range):  Temp:  [99.1 °F (37.3 °C)-100.8 °F (38.2 °C)] 99.1 °F (37.3 °C)  Pulse:  [85-99] 85  Resp:  [16-18] 16  SpO2:  [97 %-100 %] 98 %  BP: (113-128)/(71-81) 128/81     Weight: 120.2 kg (265 lb)  Body mass index is 33.12 kg/m².    Physical Exam   Constitutional: He is oriented to person, place, and time. He appears well-developed and well-nourished. No distress.   HENT:   Head: Normocephalic.   Mouth/Throat: Oropharynx is clear and moist.   Eyes: Conjunctivae and EOM are normal. Pupils are equal, round, and reactive to light. No scleral icterus.   Neck: Normal range of motion and full passive range of motion without pain. Neck supple. Carotid bruit is not present. No edema and normal range of motion present.   Cardiovascular: Normal rate, regular rhythm, normal heart sounds and intact distal pulses.  Exam reveals no gallop and no friction rub.    No murmur heard.  Pulmonary/Chest: Effort normal and breath sounds normal. No accessory muscle usage or stridor. No apnea. No respiratory distress. He has no wheezes. He has no rales. He exhibits no  tenderness.   Abdominal: Soft. Bowel sounds are normal. He exhibits no distension, no ascites and no mass. There is no tenderness. There is no rebound and no guarding.   Musculoskeletal: Normal range of motion. He exhibits no edema or tenderness.   Neurological: He is alert and oriented to person, place, and time. He has normal strength.   Skin: Skin is warm and dry. No abrasion, no bruising, no ecchymosis, no laceration and no rash noted. He is not diaphoretic. No cyanosis or erythema. No pallor. Nails show no clubbing.   superfiscial spreading eyrthema rght arn, marked.  Tender and warm to tough.      Full ROM at elbow ans shoulder joint with out tenderness   Psychiatric: He has a normal mood and affect. His behavior is normal. Judgment and thought content normal.        Significant Labs:   CBC:     Recent Labs  Lab 07/26/17 0914 07/27/17 0617   WBC 15.72* 16.31*   HGB 13.8* 13.1*   HCT 39.7* 38.4*    182     CMP:     Recent Labs  Lab 07/26/17 0914 07/27/17 0617    137   K 3.4* 3.6    107   CO2 25 24   * 105   BUN 15 10   CREATININE 0.9 0.9   CALCIUM 8.9 8.9   PROT 6.9 6.6   ALBUMIN 3.3* 3.0*   BILITOT 0.9 0.8   ALKPHOS 60 52*   AST 18 11   ALT 19 13   ANIONGAP 7* 6*   EGFRNONAA >60.0 >60.0       Significant Imaging: I have reviewed and interpreted all pertinent imaging results/findings within the past 24 hours.

## 2017-07-27 NOTE — PLAN OF CARE
Problem: Pain, Acute (Adult)  Goal: Identify Related Risk Factors and Signs and Symptoms  Related risk factors and signs and symptoms are identified upon initiation of Human Response Clinical Practice Guideline (CPG)   Outcome: Ongoing (interventions implemented as appropriate)  Pt. Pain is acknowledged pain med give as ordered ... Will continue to monitor

## 2017-07-27 NOTE — PLAN OF CARE
Problem: Pain, Acute (Adult)  Intervention: Monitor/Manage Analgesia  Pt. Educated on his pain management medications

## 2017-07-27 NOTE — ASSESSMENT & PLAN NOTE
Pain is out of proportion to expected level of pain.  Some subltle unconscioious pain related behavior noted.   Recommend reducing outpatient long term opioid therapy as soon as possible  Add neurontin  try advil/ tylenol combination,  Change to po morphine if that fails.

## 2017-07-27 NOTE — PLAN OF CARE
Problem: Patient Care Overview  Goal: Plan of Care Review  Outcome: Ongoing (interventions implemented as appropriate)  Safety maintained. VSS on RA. Pain controlled with prn pain medication. Edema, redness and warmth noted to R arm. Vancomycin IVPB given as scheduled. Pt ambulatory and tolerating diet. Bed in lowest locked position and call light with reach. NAD noted. Will continue to monitor.

## 2017-07-27 NOTE — PROGRESS NOTES
[facility] Hospital Medicine  Progress Note    Patient Name: Jae Swift  MRN: [id number]  Patient Class: OP- Observation   Admission Date: 7/26/2017  Length of Stay: 0 days  Attending Physician: Shiloh Ricks MD  Primary Care Provider: Primary Doctor [facility] Medicine Team: [facility] MED B Shiloh Ricks MD    Subjective:     Principal Problem:<principal problem not specified>    HPI:  52 y.o. male who Noticed pain and erythema of the right arm after cutting grass yesterday,  He  failed tyenol, moving made pain worse,  He has decreased ROM arm.  erythtem is located over dorsollateral aspect of right arm.  Warm to touch and marked.  He has history of shoulder surgery this year.  He denied fever,chills  insect bites, chest pain, SO, N&V    Given ancef 1 nena IV in ED.     Hospital Course:  7/27 - rash is improved.  Pain is out of proportion to what I would epect with this rash.  Suspect hyperalgesia from previous use of opioids.  Will keep in hospital for observation to make sure he is responding apprpriately.         Review of Systems   Constitutional: Negative for chills, diaphoresis and fever.             Respiratory: Negative for cough, shortness of breath and wheezing.    Cardiovascular: Negative for chest pain and leg swelling.   Gastrointestinal: Negative for abdominal pain, constipation, diarrhea, nausea and vomiting.   Genitourinary: Negative for difficulty urinating and dysuria.   Musculoskeletal: Negative for arthralgias, back pain, gait problem, neck pain and neck stiffness.   Skin: Positive for rash. Negative for wound.               Neurological: Negative for dizziness and headaches.   Psychiatric/Behavioral: Negative for agitation and behavioral problems.     Objective:     Vital Signs (Most Recent):  Temp: 99.1 °F (37.3 °C) (07/27/17 1200)  Pulse: 85 (07/27/17 1200)  Resp: 16 (07/27/17 1200)  BP: 128/81 (07/27/17 1200)  SpO2: 98 % (07/27/17 1200) Vital Signs (24h  Range):  Temp:  [99.1 °F (37.3 °C)-100.8 °F (38.2 °C)] 99.1 °F (37.3 °C)  Pulse:  [85-99] 85  Resp:  [16-18] 16  SpO2:  [97 %-100 %] 98 %  BP: (113-128)/(71-81) 128/81     Weight: 120.2 kg (265 lb)  Body mass index is 33.12 kg/m².    Physical Exam   Constitutional: He is oriented to person, place, and time. He appears well-developed and well-nourished. No distress.   HENT:   Head: Normocephalic.   Mouth/Throat: Oropharynx is clear and moist.   Eyes: Conjunctivae and EOM are normal. Pupils are equal, round, and reactive to light. No scleral icterus.   Neck: Normal range of motion and full passive range of motion without pain. Neck supple. Carotid bruit is not present. No edema and normal range of motion present.   Cardiovascular: Normal rate, regular rhythm, normal heart sounds and intact distal pulses.  Exam reveals no gallop and no friction rub.    No murmur heard.  Pulmonary/Chest: Effort normal and breath sounds normal. No accessory muscle usage or stridor. No apnea. No respiratory distress. He has no wheezes. He has no rales. He exhibits no tenderness.   Abdominal: Soft. Bowel sounds are normal. He exhibits no distension, no ascites and no mass. There is no tenderness. There is no rebound and no guarding.   Musculoskeletal: Normal range of motion. He exhibits no edema or tenderness.   Neurological: He is alert and oriented to person, place, and time. He has normal strength.   Skin: Skin is warm and dry. No abrasion, no bruising, no ecchymosis, no laceration and no rash noted. He is not diaphoretic. No cyanosis or erythema. No pallor. Nails show no clubbing.   superfiscial spreading eyrthema rght arn, marked.  Tender and warm to tough.      Full ROM at elbow ans shoulder joint with out tenderness   Psychiatric: He has a normal mood and affect. His behavior is normal. Judgment and thought content normal.        Significant Labs:   CBC:     Recent Labs  Lab 07/26/17  0914 07/27/17  0617   WBC 15.72* 16.31*   HGB  13.8* 13.1*   HCT 39.7* 38.4*    182     CMP:     Recent Labs  Lab 07/26/17  0914 07/27/17  0617    137   K 3.4* 3.6    107   CO2 25 24   * 105   BUN 15 10   CREATININE 0.9 0.9   CALCIUM 8.9 8.9   PROT 6.9 6.6   ALBUMIN 3.3* 3.0*   BILITOT 0.9 0.8   ALKPHOS 60 52*   AST 18 11   ALT 19 13   ANIONGAP 7* 6*   EGFRNONAA >60.0 >60.0       Significant Imaging: I have reviewed and interpreted all pertinent imaging results/findings within the past 24 hours.    Assessment/Plan:      Sepsis    7/27 - wbc still high,T max 100,  still w significant pain, admit to hospital  2/4 SIRs + source, cellulits  IV ancef given in ED and no response yet.  Will change to IV vanc.          Cellulitis of right upper extremity    As above        Opioid-induced hyperalgesia    Pain is out of proportion to expected level of pain.  Some subltle unconscioious pain related behavior noted.   Recommend reducing outpatient long term opioid therapy as soon as possible  Add neurontin  try advil/ tylenol combination,  Change to po morphine if that fails.               VTE Risk Mitigation         Ordered     enoxaparin injection 40 mg  Daily     Route:  Subcutaneous        07/26/17 1152     Medium Risk of VTE  Once      07/26/17 1520          Shiloh Ricks MD  Department of Hospital Medicine   [facility]

## 2017-07-28 VITALS
DIASTOLIC BLOOD PRESSURE: 70 MMHG | WEIGHT: 265 LBS | OXYGEN SATURATION: 97 % | BODY MASS INDEX: 32.95 KG/M2 | SYSTOLIC BLOOD PRESSURE: 114 MMHG | HEIGHT: 75 IN | RESPIRATION RATE: 18 BRPM | HEART RATE: 71 BPM | TEMPERATURE: 98 F

## 2017-07-28 PROBLEM — A41.9 SEPSIS: Status: RESOLVED | Noted: 2017-07-26 | Resolved: 2017-07-28

## 2017-07-28 LAB
ALBUMIN SERPL BCP-MCNC: 2.9 G/DL
ALP SERPL-CCNC: 48 U/L
ALT SERPL W/O P-5'-P-CCNC: 12 U/L
ANION GAP SERPL CALC-SCNC: 9 MMOL/L
AST SERPL-CCNC: 11 U/L
BASOPHILS # BLD AUTO: 0.03 K/UL
BASOPHILS NFR BLD: 0.2 %
BILIRUB SERPL-MCNC: 0.5 MG/DL
BUN SERPL-MCNC: 11 MG/DL
CALCIUM SERPL-MCNC: 8.9 MG/DL
CHLORIDE SERPL-SCNC: 109 MMOL/L
CO2 SERPL-SCNC: 22 MMOL/L
CREAT SERPL-MCNC: 0.9 MG/DL
DIFFERENTIAL METHOD: ABNORMAL
EOSINOPHIL # BLD AUTO: 0.2 K/UL
EOSINOPHIL NFR BLD: 1.4 %
ERYTHROCYTE [DISTWIDTH] IN BLOOD BY AUTOMATED COUNT: 14.3 %
EST. GFR  (AFRICAN AMERICAN): >60 ML/MIN/1.73 M^2
EST. GFR  (NON AFRICAN AMERICAN): >60 ML/MIN/1.73 M^2
GLUCOSE SERPL-MCNC: 109 MG/DL
HCT VFR BLD AUTO: 37.5 %
HGB BLD-MCNC: 12.9 G/DL
LYMPHOCYTES # BLD AUTO: 2.1 K/UL
LYMPHOCYTES NFR BLD: 17.2 %
MAGNESIUM SERPL-MCNC: 2.1 MG/DL
MCH RBC QN AUTO: 30.6 PG
MCHC RBC AUTO-ENTMCNC: 34.4 G/DL
MCV RBC AUTO: 89 FL
MONOCYTES # BLD AUTO: 0.7 K/UL
MONOCYTES NFR BLD: 5.4 %
NEUTROPHILS # BLD AUTO: 9.2 K/UL
NEUTROPHILS NFR BLD: 75.6 %
PHOSPHATE SERPL-MCNC: 3.9 MG/DL
PLATELET # BLD AUTO: 185 K/UL
PMV BLD AUTO: 10.4 FL
POTASSIUM SERPL-SCNC: 3.8 MMOL/L
PROT SERPL-MCNC: 6.5 G/DL
RBC # BLD AUTO: 4.22 M/UL
SODIUM SERPL-SCNC: 140 MMOL/L
VANCOMYCIN TROUGH SERPL-MCNC: 6.7 UG/ML
WBC # BLD AUTO: 12.12 K/UL

## 2017-07-28 PROCEDURE — A4216 STERILE WATER/SALINE, 10 ML: HCPCS | Performed by: HOSPITALIST

## 2017-07-28 PROCEDURE — 36415 COLL VENOUS BLD VENIPUNCTURE: CPT

## 2017-07-28 PROCEDURE — G0378 HOSPITAL OBSERVATION PER HR: HCPCS

## 2017-07-28 PROCEDURE — 80202 ASSAY OF VANCOMYCIN: CPT

## 2017-07-28 PROCEDURE — 25000003 PHARM REV CODE 250: Performed by: HOSPITALIST

## 2017-07-28 PROCEDURE — 83735 ASSAY OF MAGNESIUM: CPT

## 2017-07-28 PROCEDURE — 84100 ASSAY OF PHOSPHORUS: CPT

## 2017-07-28 PROCEDURE — 80053 COMPREHEN METABOLIC PANEL: CPT

## 2017-07-28 PROCEDURE — 63600175 PHARM REV CODE 636 W HCPCS: Performed by: HOSPITALIST

## 2017-07-28 PROCEDURE — 99217 PR OBSERVATION CARE DISCHARGE: CPT | Mod: ,,, | Performed by: HOSPITALIST

## 2017-07-28 PROCEDURE — 85025 COMPLETE CBC W/AUTO DIFF WBC: CPT

## 2017-07-28 RX ORDER — ACETAMINOPHEN 325 MG/1
650 TABLET ORAL EVERY 4 HOURS PRN
Refills: 0 | COMMUNITY
Start: 2017-07-28

## 2017-07-28 RX ORDER — GABAPENTIN 100 MG/1
200 CAPSULE ORAL 3 TIMES DAILY
Qty: 21 CAPSULE | Refills: 0 | Status: SHIPPED | OUTPATIENT
Start: 2017-07-28 | End: 2018-07-28

## 2017-07-28 RX ORDER — IBUPROFEN 400 MG/1
400 TABLET ORAL EVERY 6 HOURS PRN
Qty: 30 TABLET | Refills: 0 | Status: SHIPPED | OUTPATIENT
Start: 2017-07-28

## 2017-07-28 RX ORDER — SULFAMETHOXAZOLE AND TRIMETHOPRIM 800; 160 MG/1; MG/1
1 TABLET ORAL 2 TIMES DAILY
Qty: 14 TABLET | Refills: 0 | Status: SHIPPED | OUTPATIENT
Start: 2017-07-28

## 2017-07-28 RX ORDER — AMOXICILLIN 250 MG
1 CAPSULE ORAL 2 TIMES DAILY
COMMUNITY
Start: 2017-07-28

## 2017-07-28 RX ADMIN — IBUPROFEN 400 MG: 400 TABLET, FILM COATED ORAL at 06:07

## 2017-07-28 RX ADMIN — GABAPENTIN 200 MG: 100 CAPSULE ORAL at 06:07

## 2017-07-28 RX ADMIN — ACETAMINOPHEN 650 MG: 325 TABLET ORAL at 06:07

## 2017-07-28 RX ADMIN — Medication 10 MG: at 01:07

## 2017-07-28 RX ADMIN — VANCOMYCIN HYDROCHLORIDE 1750 MG: 100 INJECTION, POWDER, LYOPHILIZED, FOR SOLUTION INTRAVENOUS at 06:07

## 2017-07-28 RX ADMIN — Medication 10 MG: at 08:07

## 2017-07-28 RX ADMIN — Medication 3 ML: at 06:07

## 2017-07-28 RX ADMIN — STANDARDIZED SENNA CONCENTRATE AND DOCUSATE SODIUM 1 TABLET: 8.6; 5 TABLET, FILM COATED ORAL at 08:07

## 2017-07-28 NOTE — ASSESSMENT & PLAN NOTE
7/28 - see above  Pain is out of proportion to expected level of pain.  Some subltle unconscioious pain related behavior noted.   Recommend reducing outpatient long term opioid therapy as soon as possible  Add neurontin  try advil/ tylenol combination,  Change to po morphine if that fails.

## 2017-07-28 NOTE — PLAN OF CARE
Problem: Patient Care Overview  Goal: Plan of Care Review  Outcome: Ongoing (interventions implemented as appropriate)  Plan of care reviewed with patient. No distress noted at this time. Lt arm elevated on pillows. PRN pain meds given. IV antibiotics given. Will continue to monitor closely.

## 2017-07-28 NOTE — PROGRESS NOTES
Discharge instructions given to patient. No distress noted at this time. Peripheral IV removed intact. Prescriptions given to patient. Pt verbalizes understanding of medications and follow up appts. Pt left unit. Fathers waiting outside.

## 2017-07-28 NOTE — ASSESSMENT & PLAN NOTE
7/28 - VSS, rash imporved, dcc on bactrim x one week,  F/u pcp  7/27 - wbc still high,T max 100,  still w significant pain, admit to hospital  2/4 SIRs + source, cellulits  IV ancef given in ED and no response yet.  Will change to IV vanc.

## 2017-07-28 NOTE — DISCHARGE SUMMARY
"[facility] Hospital Medicine  Discharge Summary      Patient Name: Jae Swift  MRN: [id number]  Admission Date: 7/26/2017  Hospital Length of Stay: 0 days  Discharge Date and Time: No discharge date for patient encounter.  Attending Physician: Shiloh Ricks MD   Discharging Provider: Shiloh Ricks MD  Primary Care Provider: Primary Doctor No  Hospital Medicine Team: [facility] Shiloh Ricks MD    HPI:   52 y.o. male who Noticed pain and erythema of the right arm after cutting grass yesterday,  He  failed tyenol, moving made pain worse,  He has decreased ROM arm.  erythtem is located over dorsollateral aspect of right arm.  Warm to touch and marked.  He has history of shoulder surgery this year.  He denied fever,chills  insect bites, chest pain, SO, N&V    Given ancef 1 nena IV in ED.     * No surgery found *      Indwelling Lines/Drains at time of discharge:   Lines/Drains/Airways          No matching active lines, drains, or airways        Hospital Course:   7/27 - rash is improved.  Pain is out of proportion to what I would epect with this rash.  Suspect hyperalgesia from previous use of opioids.  Will keep in hospital for observation to make sure he is responding apprpriately.     7/28 - rash clinically improved.  Pain  And ROM improved.  Discused hyperalgesia and gave him "Safe use and SE of Opioids."  Pt in transition b/t acute to chronic pain, needs to stop opioids and use alternatives to prevent opioid dependence.   Neurontin started for 7 days.   F/u pcp     Consults:     Significant Diagnostic Studies: Labs:   CMP   Recent Labs  Lab 07/27/17  0617 07/28/17  0421    140   K 3.6 3.8    109   CO2 24 22*    109   BUN 10 11   CREATININE 0.9 0.9   CALCIUM 8.9 8.9   PROT 6.6 6.5   ALBUMIN 3.0* 2.9*   BILITOT 0.8 0.5   ALKPHOS 52* 48*   AST 11 11   ALT 13 12   ANIONGAP 6* 9   ESTGFRAFRICA >60.0 >60.0   EGFRNONAA >60.0 >60.0    and CBC   Recent Labs  Lab " 07/27/17  0617 07/28/17  0421   WBC 16.31* 12.12   HGB 13.1* 12.9*   HCT 38.4* 37.5*    185     Microbiology: Blood Culture No results found for: LABBLOO    Pending Diagnostic Studies:     None        Final Active Diagnoses:    Diagnosis Date Noted POA    Sepsis [A41.9] 07/26/2017 Yes    Cellulitis of right upper extremity [L03.113] 07/26/2017 Yes    Opioid-induced hyperalgesia [F11.988, R20.8] 07/27/2017 Yes      Problems Resolved During this Admission:    Diagnosis Date Noted Date Resolved POA      Sepsis    7/28 - VSS, rash imporved, dcc on bactrim x one week,  F/u pcp  7/27 - wbc still high,T max 100,  still w significant pain, admit to hospital  2/4 SIRs + source, cellulits  IV ancef given in ED and no response yet.  Will change to IV vanc.          Cellulitis of right upper extremity    As above        Opioid-induced hyperalgesia    7/28 - see above  Pain is out of proportion to expected level of pain.  Some subltle unconscioious pain related behavior noted.   Recommend reducing outpatient long term opioid therapy as soon as possible  Add neurontin  try advil/ tylenol combination,  Change to po morphine if that fails.                 Discharged Condition: good    Disposition:     Follow Up:  Follow-up Information     Primary Doctor No.               Patient Instructions:   No discharge procedures on file.  Medications:  Reconciled Home Medications:   Current Discharge Medication List      START taking these medications    Details   acetaminophen (TYLENOL) 325 MG tablet Take 2 tablets (650 mg total) by mouth every 4 (four) hours as needed.  Refills: 0      gabapentin (NEURONTIN) 100 MG capsule Take 2 capsules (200 mg total) by mouth 3 (three) times daily.  Qty: 21 capsule, Refills: 0      ibuprofen (ADVIL,MOTRIN) 400 MG tablet Take 1 tablet (400 mg total) by mouth every 6 (six) hours as needed (first for pain.  Give with tylenol).  Qty: 30 tablet, Refills: 0      senna-docusate 8.6-50 mg (PERICOLACE)  8.6-50 mg per tablet Take 1 tablet by mouth 2 (two) times daily.      sulfamethoxazole-trimethoprim 800-160mg (BACTRIM DS) 800-160 mg Tab Take 1 tablet by mouth 2 (two) times daily.  Qty: 14 tablet, Refills: 0         STOP taking these medications       oxycodone-acetaminophen (PERCOCET) 5-325 mg per tablet Comments:   Reason for Stopping:         zolpidem (AMBIEN) 10 mg Tab Comments:   Reason for Stopping:             Time spent on the discharge of patient: 35  minutes    Shiloh Ricks MD  Department of Hospital Medicine  [facility]

## 2017-12-26 ENCOUNTER — HOSPITAL ENCOUNTER (EMERGENCY)
Facility: HOSPITAL | Age: 52
Discharge: HOME | End: 2017-12-26
Attending: FAMILY MEDICINE
Payer: OTHER GOVERNMENT

## 2017-12-26 VITALS
BODY MASS INDEX: 33.57 KG/M2 | TEMPERATURE: 98 F | SYSTOLIC BLOOD PRESSURE: 196 MMHG | WEIGHT: 270 LBS | HEART RATE: 74 BPM | OXYGEN SATURATION: 99 % | RESPIRATION RATE: 18 BRPM | DIASTOLIC BLOOD PRESSURE: 91 MMHG | HEIGHT: 75 IN

## 2017-12-26 DIAGNOSIS — M25.511 RIGHT ANTERIOR SHOULDER PAIN: Primary | ICD-10-CM

## 2017-12-26 PROCEDURE — 99283 EMERGENCY DEPT VISIT LOW MDM: CPT | Mod: 25

## 2017-12-26 PROCEDURE — 96372 THER/PROPH/DIAG INJ SC/IM: CPT

## 2017-12-26 PROCEDURE — 99284 EMERGENCY DEPT VISIT MOD MDM: CPT | Mod: ,,, | Performed by: FAMILY MEDICINE

## 2017-12-26 PROCEDURE — 63600175 PHARM REV CODE 636 W HCPCS: Performed by: FAMILY MEDICINE

## 2017-12-26 RX ORDER — METHYLPREDNISOLONE 4 MG/1
TABLET ORAL
Qty: 1 PACKAGE | Refills: 0 | Status: SHIPPED | OUTPATIENT
Start: 2017-12-26 | End: 2018-01-16

## 2017-12-26 RX ORDER — TRIAMCINOLONE ACETONIDE 40 MG/ML
80 INJECTION, SUSPENSION INTRA-ARTICULAR; INTRAMUSCULAR
Status: COMPLETED | OUTPATIENT
Start: 2017-12-26 | End: 2017-12-26

## 2017-12-26 RX ADMIN — TRIAMCINOLONE ACETONIDE 80 MG: 40 INJECTION, SUSPENSION INTRA-ARTICULAR; INTRAMUSCULAR at 03:12

## 2017-12-26 NOTE — ED NOTES
"Pt stated "had right shoulder surgery on 6/24 and in rehab but I kicked up some over the last few weeks I've been lifting 50-60 lbs and Im only suppose to be lifting 20-30 lbs and Jessi been hurting, I made an appt to go see a Dr in [place] where I had my surgery. So just need some pain pills to get me thru this [address]"    "

## 2017-12-26 NOTE — PROVIDER PROGRESS NOTES - EMERGENCY DEPT.
Encounter Date: 12/26/2017    ED Physician Progress Notes            Reviewed data from the [place], [place] and [facility] controlled drug databases.  Regarding prescriptions.  Daily opiate therapy prescribed primarily in [place] since 2014

## 2017-12-26 NOTE — ED PROVIDER NOTES
"Encounter Date: 12/26/2017    SCRIBE #1 NOTE: I, Agnes Alvarado, am scribing for, and in the presence of,  Dr. Ybarra . I have scribed the following portions of the note - Other sections scribed: HPI, ROS, PE.       History     Chief Complaint   Patient presents with    Shoulder Pain     chronic pain rt shoulder - tendon reattachment.  Also, c/o URI cough, cold.      Time patient was seen by the provider: 3:21 PM      The patient is a 52 y.o. male with hx of arthritis, tendonitis, and a surgical history of shoulder surgery that presents to the ED with a complaint of right shoulder pain for the last 2 days. Patient describes shoulder pain as a "sharp shooting pain." He endorses swelling in right upper extremity and sleep disturbance secondary to pain. He reports taking ibuprofen, which has not improved pain. Patient has no other complaints at this time.      The history is provided by the patient and medical records.     Review of patient's allergies indicates:  No Known Allergies  Past Medical History:   Diagnosis Date    Arthritis     Degenerative joint disease of spine     Tendonitis      Past Surgical History:   Procedure Laterality Date    ESOPHAGOGASTRODUODENOSCOPY      KNEE SURGERY      SHOULDER SURGERY Right 06/24/2017     Family History   Problem Relation Age of Onset    Diabetes Mother     Diabetes Father      Social History   Substance Use Topics    Smoking status: Never Smoker    Smokeless tobacco: Never Used    Alcohol use No     Review of Systems   Musculoskeletal: Positive for joint swelling.        Right shoulder pain   Psychiatric/Behavioral: Positive for sleep disturbance (secondary to pain ).       Physical Exam     Initial Vitals [12/26/17 1304]   BP Pulse Resp Temp SpO2   (!) 196/91 74 18 98.2 °F (36.8 °C) 99 %      MAP       126         Physical Exam    Constitutional: No distress.   HENT:   Head: Atraumatic.   Neck: Normal range of motion. Neck supple.   Cardiovascular: Normal rate, " regular rhythm, normal heart sounds and intact distal pulses.   Pulmonary/Chest: Breath sounds normal. No respiratory distress. He has no wheezes. He has no rhonchi. He has no rales.   Musculoskeletal: Normal range of motion.   Patient has surgical incision site over right shoulder consistent with arthroscopic surgery as he describes. No evidence of erythema, induration, or deformities.   Distal forearm wrist and hand functional with normal circulatory status.    Neurological: He is alert and oriented to person, place, and time.         ED Course   Procedures  Labs Reviewed - No data to display          Medical Decision Making:   History:   Old Medical Records: I decided to obtain old medical records.  Right shoulder pain reconstructed surgically now increasing pain associated with overuse activities, unresponsive to NSAIDs.  Given patient and I am steroid injection and a course of Medrol.  He will follow-up with his orthopedist in [place] later this week as he has scheduled            Scribe Attestation:   Scribe #1: I performed the above scribed service and the documentation accurately describes the services I performed. I attest to the accuracy of the note.            ED Course      Clinical Impression:   The encounter diagnosis was Right anterior shoulder pain.    Disposition:   Disposition: Discharged  Condition: Stable                        Apolinar Ybarra MD  12/26/17 0786

## 2018-12-24 ENCOUNTER — HOSPITAL ENCOUNTER (EMERGENCY)
Facility: HOSPITAL | Age: 53
Discharge: HOME | End: 2018-12-24
Attending: EMERGENCY MEDICINE
Payer: OTHER GOVERNMENT

## 2018-12-24 VITALS
DIASTOLIC BLOOD PRESSURE: 95 MMHG | TEMPERATURE: 98 F | HEART RATE: 98 BPM | OXYGEN SATURATION: 97 % | SYSTOLIC BLOOD PRESSURE: 152 MMHG | RESPIRATION RATE: 18 BRPM

## 2018-12-24 DIAGNOSIS — S16.1XXA STRAIN OF NECK MUSCLE, INITIAL ENCOUNTER: Primary | ICD-10-CM

## 2018-12-24 PROCEDURE — 25000003 PHARM REV CODE 250: Performed by: EMERGENCY MEDICINE

## 2018-12-24 PROCEDURE — 99284 EMERGENCY DEPT VISIT MOD MDM: CPT | Mod: 25

## 2018-12-24 PROCEDURE — 63600175 PHARM REV CODE 636 W HCPCS: Performed by: EMERGENCY MEDICINE

## 2018-12-24 PROCEDURE — 96372 THER/PROPH/DIAG INJ SC/IM: CPT

## 2018-12-24 PROCEDURE — 99283 EMERGENCY DEPT VISIT LOW MDM: CPT | Mod: ,,, | Performed by: EMERGENCY MEDICINE

## 2018-12-24 RX ORDER — DEXAMETHASONE SODIUM PHOSPHATE 4 MG/ML
8 INJECTION, SOLUTION INTRA-ARTICULAR; INTRALESIONAL; INTRAMUSCULAR; INTRAVENOUS; SOFT TISSUE
Status: COMPLETED | OUTPATIENT
Start: 2018-12-24 | End: 2018-12-24

## 2018-12-24 RX ORDER — CYCLOBENZAPRINE HCL 10 MG
10 TABLET ORAL
Status: COMPLETED | OUTPATIENT
Start: 2018-12-24 | End: 2018-12-24

## 2018-12-24 RX ORDER — CYCLOBENZAPRINE HCL 10 MG
10 TABLET ORAL 3 TIMES DAILY PRN
Qty: 15 TABLET | Refills: 0 | Status: SHIPPED | OUTPATIENT
Start: 2018-12-24 | End: 2018-12-29

## 2018-12-24 RX ADMIN — DEXAMETHASONE SODIUM PHOSPHATE 8 MG: 4 INJECTION, SOLUTION INTRAMUSCULAR; INTRAVENOUS at 07:12

## 2018-12-24 RX ADMIN — CYCLOBENZAPRINE HYDROCHLORIDE 10 MG: 10 TABLET, FILM COATED ORAL at 07:12

## 2018-12-24 NOTE — ED NOTES
Pain: neck pain and chronic pain 10/10 radiating to his shoulders  x 3 hours    Psychosocial: Patient is calm and cooperative. Patients insight and judgement are appropriate to situation. Appears clean, well maintained, with clothing appropriate to environment. No evidence of delusions, hallucinations, or psychosis.    Neuro: Eyes open spontaneously. Awake, alert, oriented x 4. Speech clear and appropriate. Tolerating saliva secretions well. Able to follow commands, demonstrating ability to actively and appropriately communicate within context of current conversation. Symmetrical facial muscles. Moving all extremities well with no noted weakness. Adequate muscle tone present. Movement is purposeful. No evidence of impaired sensation. Responds to external stimuli with appropriate reflexes.     Airway: Bilateral chest rise and fall. RR regular and non-labored. Air entry patent and clear x 5 lobes of the lungs. No crepitus or subcutaneous emphysema noted on palpation.     Circulatory: Skin warm, dry, and pink. Apical and radial pulses strong and regular. Capillary refill/skin blanching less than 3 seconds to distal of 4 extremities. Placed on CM in NSR without ectopy.    Abdomen: Abdomen obese, soft and non-distended. Positive normo-active bowel sounds x 4 quadrants.     Urinary: Patient reports routine urination without pain, frequency, or urgency. Voids independently. Reports urine appears riky/yellow in color.    Extremities: No redness, heat, swelling, deformity, or pain.     Skin: Intact with no bruising/discolorations noted.

## 2018-12-24 NOTE — ED NOTES
Assumed patient care.     Pt seated on side of stretcher, anxious, speaking rapidly, respirations even and unlabored. Stretcher locked and in lowest position, call bell within reach. Pulse ox and BP cuff applied cycling Q 30 minute vitals. Pt states no needs at this time. Will continue to monitor and update pt on plan of care.      LOC: The patient is awake, alert and aware of environment with an appropriate affect, the patient is oriented x 3 and speaking appropriately.  APPEARANCE: Patient anxious, speaking rapidly, restless, rocking back and forth.  SKIN: The skin is warm and dry, color consistent with ethnicity, skin intact, no breakdown or bruising noted.  MUSCULOSKELETAL: Patient moving all extremities well, no obvious swelling or deformities noted. Pt reports right sided neck pain, hx of compressed discs.  RESPIRATORY: Airway is open and patent, breath sounds clear throughout all lung fields; respirations are spontaneous, patient has a normal effort and rate, no accessory muscle use noted.   CARDIAC: Patient has no peripheral edema noted, capillary refill < 3 seconds. No complaints of chest pain at this time.  ABDOMEN: Soft and non tender to palpation, no distention noted. Bowel sounds present x 4.   NEUROLOGIC: PERRL, 3 mm bilaterally, eyes open spontaneously, behavior appropriate to situation, follows commands, facial expression symmetrical, bilateral hand grasp equal and even, purposeful motor response noted, normal sensation in all extremities when touched with a finger.

## 2018-12-24 NOTE — ED TRIAGE NOTES
53 yr old male pt presents to the ed with complaints of back pain and neck pain x 3-4 hours. Pt is aox 4 and suffers from chronic back pain secondary to djd. Pt denies chest pain or sob.

## 2018-12-24 NOTE — ED PROVIDER NOTES
Encounter Date: 12/24/2018    SCRIBE #1 NOTE: I, Seven Jauregui, am scribing for, and in the presence of,  Dr. Méndez. I have scribed the entire note.       History     Chief Complaint   Patient presents with    Back Pain     Presents to ED c/o back and neck pain, both are chronic issues      53 year old male with a history of chronic pain and degenerative disc disease presents with a chief complaint of right neck pain. Patient has been suffering with these symptoms for 8-9 years but they have worsened over the past few months. He has tried NSAIDs, warm compresses, and goody powder with some relief. He was recently prescribed flexeril with relief. His pain is located in his right neck without radiation. He has an MRI scheduled next week at home in [place]. He denies weakness or paraesthesias.       The history is provided by the patient.     Review of patient's allergies indicates:  No Known Allergies  Past Medical History:   Diagnosis Date    Arthritis     Degenerative joint disease of spine     Tendonitis      Past Surgical History:   Procedure Laterality Date    ESOPHAGOGASTRODUODENOSCOPY      KNEE SURGERY      SHOULDER SURGERY Right 06/24/2017     Family History   Problem Relation Age of Onset    Diabetes Mother     Diabetes Father      Social History     Tobacco Use    Smoking status: Never Smoker    Smokeless tobacco: Never Used   Substance Use Topics    Alcohol use: No    Drug use: No     Review of Systems   Constitutional: Negative for fever.   HENT: Negative for sore throat.    Eyes: Negative for visual disturbance.   Respiratory: Negative for shortness of breath.    Cardiovascular: Negative for chest pain.   Gastrointestinal: Negative for nausea.   Genitourinary: Negative for dysuria.   Musculoskeletal: Positive for back pain (chronic) and neck pain (right).   Skin: Negative for rash.   Neurological: Negative for weakness.       Physical Exam     Initial Vitals [12/24/18 0612]   BP Pulse  Resp Temp SpO2   (!) 141/95 107 20 97.9 °F (36.6 °C) 98 %      MAP       --         Physical Exam    Nursing note and vitals reviewed.  Constitutional: He appears well-developed and well-nourished. He is not diaphoretic. No distress.   HENT:   Head: Normocephalic.   Neck: Neck supple.   Cardiovascular: Normal rate.   Pulmonary/Chest: No respiratory distress.   Musculoskeletal:   Right cervical paraspinal spasm and tenderness. No midline tenderness or step offs.    Neurological: He is alert.   Strength and sensation intact in bilateral upper extremities   Skin: Skin is warm.         ED Course   Procedures  Labs Reviewed - No data to display       Imaging Results    None          Medical Decision Making:   History:   Old Medical Records: I decided to obtain old medical records.  Initial Assessment:   53 year old male with a history of chronic pain and degenerative disc disease presents with a chief complaint of right neck pain.   Differential Diagnosis:   No acute trauma or midline tenderness, I doubt acute fracture.   There is no radicular weakness to suggest acute nerve root compression.   Symptoms overall concerning for cervical strain.    ED Management:  Patient was provided with steroid injection. Discharged with instructions for symptomatic treatment including use of NSAIDs. Prescription for flexeril provided.             Scribe Attestation:   Scribe #1: I performed the above scribed service and the documentation accurately describes the services I performed. I attest to the accuracy of the note.    Attending Attestation:           Physician Attestation for Scribe:      Comments: I, Dr. Jeremiah Méndez, personally performed the services described in this documentation. All medical record entries made by the scribe were at my direction and in my presence.  I have reviewed the chart and agree that the record reflects my personal performance and is accurate and complete. Jeremiah Méndez MD.  12:51 PM 12/24/2018                  Clinical Impression:   The encounter diagnosis was Strain of neck muscle, initial encounter.      Disposition:   Disposition: Discharged  Condition: Stable                        Jeremiah Méndez MD  12/24/18 1177